# Patient Record
Sex: FEMALE | Race: WHITE
[De-identification: names, ages, dates, MRNs, and addresses within clinical notes are randomized per-mention and may not be internally consistent; named-entity substitution may affect disease eponyms.]

---

## 2019-11-22 ENCOUNTER — HOSPITAL ENCOUNTER (EMERGENCY)
Dept: HOSPITAL 72 - EMR | Age: 29
Discharge: HOME | End: 2019-11-22
Payer: COMMERCIAL

## 2019-11-22 VITALS — SYSTOLIC BLOOD PRESSURE: 135 MMHG | DIASTOLIC BLOOD PRESSURE: 85 MMHG

## 2019-11-22 VITALS — BODY MASS INDEX: 18.22 KG/M2 | HEIGHT: 62 IN | WEIGHT: 99 LBS

## 2019-11-22 DIAGNOSIS — R07.81: Primary | ICD-10-CM

## 2019-11-22 PROCEDURE — 71045 X-RAY EXAM CHEST 1 VIEW: CPT

## 2019-11-22 PROCEDURE — 99283 EMERGENCY DEPT VISIT LOW MDM: CPT

## 2019-11-22 NOTE — EMERGENCY ROOM REPORT
History of Present Illness


General


Chief Complaint:  Pain


Source:  Patient





Present Illness


HPI


29-year-old female presents ED for evaluation.  Patient complaining of left 

lower rib pain x1 week.  States that she cannot recall any specific injury but 

states that she was drinking that night and may have fallen.  Dull, 6 out of 10

, nonradiating.  Notes pain with movement.  Denies shortness of breath.  Denies 

any other injuries.  No other aggravating relieving factors.  Denies any other 

associated symptoms


Allergies:  


Coded Allergies:  


     No Known Allergies (Unverified , 19)





Patient History


Past Medical History:  none


Past Surgical History:  none


Pertinent Family History:  none


Social History:  Denies: smoking, alcohol use, drug use


Last Menstrual Period:  19


Pregnant Now:  No


:  0


Immunizations:  UTD


Reviewed Nursing Documentation:  PMH: Agreed; PSxH: Agreed





Nursing Documentation-PMH


Past Medical History:  No History, Except For


Hx Hypertension:  No


Hx Pacemaker:  No


Hx Asthma:  No


Hx COPD:  No


Hx Diabetes:  No


Hx Cancer:  No


Hx Gastrointestinal Problems:  No


Hx Dialysis:  No


History Of Psychiatric Problem:  No


Hx Neurological Problems:  No


Hx Cerebrovascular Accident:  No


Hx Seizures:  No





Review of Systems


All Other Systems:  negative except mentioned in HPI





Physical Exam





Vital Signs








  Date Time  Temp Pulse Resp B/P (MAP) Pulse Ox O2 Delivery O2 Flow Rate FiO2


 


19 20:06 98.6 83 15 135/85 (102) 97 Room Air  








Sp02 EP Interpretation:  reviewed, normal


General Appearance:  no apparent distress, alert, GCS 15, non-toxic


Head:  normocephalic


Eyes:  bilateral eye normal inspection, bilateral eye PERRL


ENT:  normal ENT inspection


Neck:  normal inspection


Respiratory:  lungs clear, normal breath sounds, speaking full sentences, other 

- L lower anterior rib pain. no bruising/crepitus


Cardiovascular #1:  regular rate, rhythm, no edema


Gastrointestinal:  normal inspection


Rectal:  deferred


Genitourinary:  no CVA tenderness


Musculoskeletal:  normal inspection


Neurologic:  alert, motor strength/tone normal, oriented x3, sensory intact, 

responsive, speech normal


Psychiatric:  normal inspection


Skin:  no rash


Lymphatic:  normal inspection





Medical Decision Making


Diagnostic Impression:  


 Primary Impression:  


 Rib pain


ER Course


Hospital Course 


30 yo F presents to ED c/o L lower rib pain. ? fall





Differential diagnoses include: Fracture, dislocation, sprain, contusion





Clinical course


Patient placed on stretcher.  After initial history and physical, I ordered 

CXR. patient declined pain meds. 





Xrays read shows no acute fracture/dislocation. no PTX





discussed findings with patient.  Will discharge to home.  Does not have a PMD.

  Will provide referrals





Diagnosis - rib pain 





Stable and discharged to home with prescription for tylenol.  apply ice.  

weight bear as tolerated.  Followup with PMD.  Return to ED if symptoms recur 

or worsen


Chest X-Ray Diagnostic Results


Chest X-Ray Diagnostic Results :  


   Chest X-Ray Ordered:  Yes


   # of Views/Limited/Complete:  1 View


   Indication:  Chest Pain


   EP Interpretation:  Yes


   Interpretation:  no consolidation, no effusion, no pneumothorax, no acute 

cardiopulmonary disease


   Impression:  No acute disease


   Electronically Signed by:  Electronically signed by Colin Park MD





Last Vital Signs








  Date Time  Temp Pulse Resp B/P (MAP) Pulse Ox O2 Delivery O2 Flow Rate FiO2


 


19 21:11 98.6 70 15 135/85 97 Room Air  








Status:  improved


Disposition:  HOME, SELF-CARE


Condition:  Stable


Scripts


Acetaminophen* (TYLENOL EXTRA STRENGTH*) 500 Mg Tablet


500 MG ORAL Q8H PRN for Prn Headache/Temp > 101, #30 TAB 0 Refills


   Prov: Colin Park MD         19


Referrals:  


[facility]


Patient Instructions:  Rib Contusion











Colin Park MD 2019 21:16

## 2019-11-22 NOTE — NUR
ED Nurse Note:



Patient walked in to ER c/o left rib pain since last friday. Denies any fall, 
trauma or injury. No SOB. Afebrile. VSS.

## 2019-11-22 NOTE — NUR
ED Nurse Note:



Pt cleared by ERMD for discharge.  DC instructions/prescription was given and 
explained to pt and verbalized understanding of teachings. All medical deviecs 
such as ID band removed. Pt is AAO x4, ambulatory and left with all personal 
belongings.

## 2019-11-22 NOTE — DIAGNOSTIC IMAGING REPORT
EXAM:

  XR Chest, 1 View

 

CLINICAL HISTORY:

  PAIN

 

TECHNIQUE:

  Frontal view of the chest.

 

COMPARISON:

  No relevant prior studies available.

 

FINDINGS:

  Lungs:  Unremarkable.  No consolidation.

  Pleural space:  Unremarkable.  No pneumothorax.

  Heart:  Unremarkable.  No cardiomegaly.

  Mediastinum:  Unremarkable.

  Bones/joints:  Unremarkable.

 

IMPRESSION:     

1.  No acute cardiopulmonary disease.

2.  If there is continued concern recommend PA and lateral chest 

radiographs.

## 2020-05-20 ENCOUNTER — HOSPITAL ENCOUNTER (EMERGENCY)
Dept: HOSPITAL 72 - EMR | Age: 30
Discharge: HOME | End: 2020-05-20
Payer: SELF-PAY

## 2020-05-20 VITALS — HEIGHT: 62 IN | BODY MASS INDEX: 20.24 KG/M2 | WEIGHT: 110 LBS

## 2020-05-20 VITALS — SYSTOLIC BLOOD PRESSURE: 145 MMHG | DIASTOLIC BLOOD PRESSURE: 90 MMHG

## 2020-05-20 DIAGNOSIS — T50.901A: Primary | ICD-10-CM

## 2020-05-20 PROCEDURE — 71045 X-RAY EXAM CHEST 1 VIEW: CPT

## 2020-05-20 PROCEDURE — 96360 HYDRATION IV INFUSION INIT: CPT

## 2020-05-20 PROCEDURE — 99283 EMERGENCY DEPT VISIT LOW MDM: CPT

## 2020-05-20 NOTE — DIAGNOSTIC IMAGING REPORT
Indication: Chest pain

 

Technique: XRAY Chest 1v

 

Comparison: 11/22/2019

 

Findings: Heart size and mediastinal contours are within normal limits for AP

technique. There is no focal airspace consolidation, pneumothorax or pleural

effusion. Nipple shadows project over the bilateral lower chest Osseous structures

demonstrate no acute abnormality.

 

Impression: No radiographic evidence of acute cardiopulmonary disease.

## 2020-05-20 NOTE — EMERGENCY ROOM REPORT
History of Present Illness


General


Chief Complaint:  Overdose


Source:  Patient, EMS





Present Illness


HPI


Patient presents after what was reported as overdose by paramedics patient here 

also does report doing


 'blow' and 'ecstasy'





Patient reported that she was with 2 other friends another friend also had been 

taken to the hospital


Patient reports that she has overdosed several times on different drugs she was 

given Narcan by paramedics and has become awake alert





Patient was initially decreased with her GCS per the paramedics at this time 

denies any headache denies any chest pain or shortness of breath denies any 

vomiting or diarrhea


Upon arrival the patient is already asking to leave and wants to go home


Allergies:  


Coded Allergies:  


     No Known Allergies (Unverified , 11/22/19)





COVID-19 Screening


Contact w/high risk pt:  No


Recent Travel to affected area:  No


Experienced COVID-19 symptoms?:  No


COVID-19 Testing performed PTA:  No





Patient History


Past Medical History:  see triage record


Reviewed Nursing Documentation:  PMH: Agreed; PSxH: Agreed





Nursing Documentation-PMH


Past Medical History:  No Stated History


Hx Hypertension:  No


Hx Pacemaker:  No


Hx Asthma:  No


Hx COPD:  No


Hx Diabetes:  No


Hx Cancer:  No


Hx Gastrointestinal Problems:  No


Hx Dialysis:  No


Hx Neurological Problems:  No


Hx Cerebrovascular Accident:  No


Hx Seizures:  No





Review of Systems


All Other Systems:  negative except mentioned in HPI





Physical Exam





Vital Signs








  Date Time  Temp Pulse Resp B/P (MAP) Pulse Ox O2 Delivery O2 Flow Rate FiO2


 


5/20/20 12:33 98.2 101 16 145/90 (108) 96 Room Air  








Sp02 EP Interpretation:  reviewed, normal


General Appearance:  well appearing, no apparent distress


Head:  normocephalic, atraumatic


Eyes:  bilateral eye PERRL, bilateral eye EOMI


ENT:  hearing grossly normal, normal pharynx, TMs + canals normal, uvula midline


Neck:  full range of motion, supple, no meningismus, no bony tend


Respiratory:  lungs clear, normal breath sounds, no rhonchi, no respiratory 

distress, no retraction, no accessory muscle use


Cardiovascular #1:  normal peripheral pulses, regular rate, rhythm, no edema, 

no gallop, no JVD, no murmur


Gastrointestinal:  normal bowel sounds, non tender, soft, no mass, no 

organomegaly, non-distended, no guarding, no hernia, no pulsatile mass, no 

rebound


Genitourinary:  no CVA tenderness


Musculoskeletal:  other - Abrasion over the right shoulder but moving all 

extremities


Neurologic:  motor strength/tone normal, CNs III-XII nml as tested, oriented x3

, sensory intact, responsive


Psychiatric:  mood/affect normal


Skin:  other - As above


Lymphatic:  normal inspection, no adenopathy





Medical Decision Making


Diagnostic Impression:  


 Primary Impression:  


 Drug overdose


ER Course


Patient presents awake alert GCS 15 she had been given Narcan in route








Upon arrival the patient is refusing any further intervention


There was no reports of homicidal or suicidal thoughts this appears to have 

been an unintentional overdose








Patient was requested to obtain further blood work and IV hydration however 

again she continues to refuse chest x-ray was normal





Patient's friend has arrived and the patient requesting to leave


Patient has full decision-making capacity has been observed after Narcan and 

remains awake she is aware that the medicine can wear off and her


Symptoms can worsen again


She also did not wait to have a prescription for Narcan and left AGAINST 

MEDICAL ADVICE


Rhythm Strip Diag. Results


EP Interpretation:  yes


Rate:  80


Rhythm:  NSR, no PVC's, no ectopy





Chest X-Ray Diagnostic Results


Chest X-Ray Diagnostic Results :  


   Chest X-Ray Ordered:  Yes


   # of Views/Limited/Complete:  1 View


   Indication:  Chest Pain


   EP Interpretation:  Yes


   Interpretation:  no consolidation, no effusion, no pneumothorax


   Impression:  No acute disease


   Electronically Signed by:  Franco Scruggs DO





Last Vital Signs








  Date Time  Temp Pulse Resp B/P (MAP) Pulse Ox O2 Delivery O2 Flow Rate FiO2


 


5/20/20 12:47 98.2 102 20 145/90 98 Room Air  








Status:  improved


Disposition:  HOME, SELF-CARE


Condition:  Improved


Referrals:  


NOT CHOSEN [facility]/MD,REFERRING (PCP)





Additional Instructions:  


Patient is provided with the discharge instructions notified to follow up with 

primary doctor in the next 2-3 days otherwise return to the er with any 

worsening symptoms.


Please note that this report is being documented using DRAGON technology.  This 

can lead to erroneous entry secondary to incorrect interpretation by the 

dictating instrument.











Franco Scruggs DO May 20, 2020 14:06